# Patient Record
Sex: MALE | Race: ASIAN | URBAN - METROPOLITAN AREA
[De-identification: names, ages, dates, MRNs, and addresses within clinical notes are randomized per-mention and may not be internally consistent; named-entity substitution may affect disease eponyms.]

---

## 2017-11-20 ENCOUNTER — APPOINTMENT (OUTPATIENT)
Dept: GENERAL RADIOLOGY | Facility: CLINIC | Age: 27
End: 2017-11-20
Attending: EMERGENCY MEDICINE
Payer: COMMERCIAL

## 2017-11-20 ENCOUNTER — HOSPITAL ENCOUNTER (EMERGENCY)
Facility: CLINIC | Age: 27
Discharge: HOME | End: 2017-11-21
Attending: EMERGENCY MEDICINE | Admitting: EMERGENCY MEDICINE
Payer: COMMERCIAL

## 2017-11-20 VITALS
RESPIRATION RATE: 16 BRPM | DIASTOLIC BLOOD PRESSURE: 47 MMHG | TEMPERATURE: 97.9 F | SYSTOLIC BLOOD PRESSURE: 141 MMHG | OXYGEN SATURATION: 100 %

## 2017-11-20 DIAGNOSIS — M23.209 OLD TEAR OF MENISCUS OF KNEE, UNSPECIFIED LATERALITY, UNSPECIFIED MENISCUS, UNSPECIFIED TEAR TYPE: ICD-10-CM

## 2017-11-20 PROCEDURE — 73560 X-RAY EXAM OF KNEE 1 OR 2: CPT | Mod: RT

## 2017-11-20 PROCEDURE — 99283 EMERGENCY DEPT VISIT LOW MDM: CPT | Mod: Z6 | Performed by: EMERGENCY MEDICINE

## 2017-11-20 PROCEDURE — 99284 EMERGENCY DEPT VISIT MOD MDM: CPT | Mod: 25 | Performed by: EMERGENCY MEDICINE

## 2017-11-20 PROCEDURE — 29505 APPLICATION LONG LEG SPLINT: CPT | Mod: RT | Performed by: EMERGENCY MEDICINE

## 2017-11-20 RX ORDER — OXYCODONE HYDROCHLORIDE 5 MG/1
5 TABLET ORAL EVERY 6 HOURS PRN
Qty: 10 TABLET | Refills: 0 | Status: SHIPPED | OUTPATIENT
Start: 2017-11-20

## 2017-11-20 NOTE — ED NOTES
"Pt comes in with a \"locked up\" right knee. Pt has hx torn right acl, waiting for surgery. Was at the gym today when he suddenly could not bend his knee. Alert and oriented, vss.   "

## 2017-11-20 NOTE — ED AVS SNAPSHOT
[facility], Emergency Department    [address] [facility] [address]    Phone:  [phone]                                       Kelton Ibrahim   MRN: [id number]    Department:  [facility], Emergency Department   Date of Visit:  11/20/2017           Patient Information     Date Of Birth          1990        Your diagnoses for this visit were:     Old tear of meniscus of knee, unspecified laterality, unspecified meniscus, unspecified tear type        You were seen by Andrew Clifford MD.      Follow-up Information     Follow up with [facility], Emergency Department.    Specialty:  EMERGENCY MEDICINE    Why:  If symptoms worsen    Contact information:    [address] [facility] [address] [phone]    Additional information:    The [facility] is located on the corner of [facility] and [facility] on the [facility]. It is easily accessible from virtually any point in the [place] area, via [address] and [address].        Discharge Instructions           Knee Pain with Possible Torn Meniscus    The meniscus is a tough cartilage pad that cushions the inside of the knee joint. It helps absorb the shock from movement. It also spreads the weight of your body evenly across the knee joint. This prevents excess wear and tear to the bones of that joint.  The most common causes of meniscal tears are injuries, especially related to sports and degenerative disease that happens with aging.  A meniscus tear commonly happens during a twisting injury when the knee is bent. This causes pain, swelling, reduced movement of the knee, and trouble walking. There may be popping, clicking, joint locking or inability to completely straighten the knee. Ligaments of the knee may also be injured.  A torn meniscus is diagnosed by physical exam and X-rays. In the case of a severe injury, the knee may be too painful to examine fully. A more accurate exam can be done after the  initial swelling goes down. An MRI may be ordered to make a final diagnosis.  If your healthcare provider suspects a meniscal injury, you will treat your knee with ice and rest and preventing movement of the knee. A splint or knee brace that keeps your leg straight may be put on to protect the joint. Depending on the severity of the injury, surgery may be needed. A cartilage injury may take 4-12 weeks to heal depending on how bad it is.  Home care    Stay off the injured leg as much as possible until you can walk on it without pain. If you have a lot of pain when walking, crutches or a walker may be prescribed. (These can be rented or bought at many pharmacies and surgical or orthopedic supply stores). Follow your healthcare provider's advice about when to begin putting weight on that leg.    Keep your leg elevated to reduce pain and swelling. When sleeping, place a pillow under the injured leg. When sitting, support the injured leg so it is level with your waist. This is very important during the first 48 hours.    Apply an ice pack over the injured area for 15 to 20 minutes every 3 to 6 hours. You should do this for the first 24 to 48 hours. You can make an ice pack by filling a plastic bag that seals at the top with ice cubes and then wrapping it with a thin towel. Continue to use ice packs for relief of pain and swelling as needed. As the ice melts, be careful to avoid getting your wrap, splint, or cast wet. After 48 hours, apply heat (warm shower or warm bath) for 15 to 20 minutes several times a day, or alternate ice and heat. You can place the ice pack directly over the splint. If you have to wear a hook-and-loop knee brace, you can open it to apply the ice pack, or heat, directly to the knee. Never put ice directly on the skin. Always wrap the ice in a towel or other type of cloth.    You may use over-the-counter pain medicine to control pain, unless another pain medicine was prescribed. If you have chronic  liver or kidney disease or ever had a stomach ulcer, talk with your healthcare provider before using these medicines.    If you were given a splint, keep it dry at all times. Bathe with your splint out of the water. Protect it with a large plastic bag that is rubber-banded at the top end. If a fiberglass splint gets wet, you can dry it with a hair dryer. If you have a hook-and-loop knee brace, you can remove this to bathe, unless told otherwise.    Check with your healthcare provider before returning to sports or full work duties.  Follow-up care  Follow up with your healthcare provider, or as advised. This is usually within 1-2 weeks. Further testing may be required to check the extent of your injury.  If X-rays were taken, you will be told of any new findings that may affect your care.  Call 911  Call 911 if you have:     Shortness of breath    Chest pain  When to seek medical advice  Call your healthcare provider right away if any of these occur:    Toes or foot gets swollen, cold, blue, numb, or tingly    Pain or swelling spreads over the knee or calf    Warmth or redness appears over the knee or calf    Fever of 100.4 F (38 C) or above lasting for 24 to 48 hours  Date Last Reviewed: 11/23/2015 2000-2017 [employer]. [address] [place] [address]. All rights reserved. This information is not intended as a substitute for professional medical care. Always follow your healthcare professional's instructions.          Treating Meniscus Problems  The type of surgery you have depends on the nature of your tear. its size and location. Your surgeon may use arthroscopy, a method that involves putting a tiny camera inside your knee, so that your doctor can clearly see your joint. Arthroscopy requires only small incisions (cuts), and you can usually go home the same day as surgery. During surgery, you may have local anesthesia (your doctor numbs your knee with medicine), a regional block  (numbing your body from the waist down), or general anesthesia (your doctor gives you drugs to put you into a deep sleep so you do't feel pain.)  Pre-op checklist    Don't eat or drink 10 hours before surgery.    Arrange for someone to drive you home after surgery.    Tell your doctor if you take any medicine, supplements, or herbal remedies.        Repair  For certain tears, your surgeon will try to repair the meniscus. He or she will sew torn edges so they can heal properly. Or your surgeon will use special fasteners to repair damage. In some cases, repairs may require another incision at the back or side of your knee.       Removal  In most cases, your surgeon will remove the damaged part of your meniscus. The meniscus won't completely grow back, so your doctor will remove as little tissue as possible. Other tissue, called the articular cartilage, will take over the role as shock absorber for your knee joint.       After surgery  You'll spend some time in the recovery area and can go home when you've recovered from the anesthesia. Your knee will be bandaged, and you may have stitches, steri-strips, or staples. You may need crutches to keep weight off the knee and may have a splint for support.  Date Last Reviewed: 9/4/2015 2000-2017 [employer]. [address] [place] [address]. All rights reserved. This information is not intended as a substitute for professional medical care. Always follow your healthcare professional's instructions.          Understanding Meniscal Tears    The meniscus is a tough cushion of fibrous tissue called cartilage in the knee joint. It cushions the knee. It absorbs shock and helps spread weight across the knee joint. It also works with other parts of the knee to help keep the joint stable. Injury or aging can cause the meniscus to tear and lead to pain and problems using the knee.   What causes meniscal tears?  A sudden injury can tear the meniscus. This is  often because of planting the foot and twisting the knee. Sports such as soccer, football, and basketball are often involved. Repeated actions, such as squatting, may also lead to a tear. Breakdown of the meniscus because of aging can also lead to tears.  Symptoms of meniscal tears  These can include:    Knee pain    Knee swelling    Catching of the knee or inability to straighten the knee    Unstable feeling in the knee  Treatment for meniscal tears  A tear is unlikely to heal on its own. You often will need surgery to repair a tear. In many cases, your healthcare provider will first try treatments to help relieve symptoms. These may include:    Rest the knee. This means avoiding any activity that puts stress on the knee joint. These include kneeling, squatting, jogging, and climbing stairs. In some cases, you may need to use crutches for a time to keep body weight off of the knee joint.    Cold pack. Putting a cold pack on the knee helps reduce pain and swelling.    Knee brace. Bracing the knee helps support it.    Medicine. Prescription and over-the-counter pain medicines can help relieve swelling and pain.    Exercises. Exercises help strengthen the muscles of the leg to help support the knee joint.  If these treatments don t help relieve symptoms or the injury is severe, you may need surgery. This can repair the meniscus to relieve symptoms and restore movement.     When to call your healthcare provider  Call your healthcare provider right away if you have any of these:    Fever of 100.4 F (38 C) or higher, or as directed    Pain or swelling that gets worse, including pain in the calf    Numbness or tingling in leg or foot    You suddenly can t put any weight on your leg    Your knee  locks    Date Last Reviewed: 3/10/2016    5972-3519 [employer]. [address] [place] [address], [place] [address]. All rights reserved. This information is not intended as a substitute for professional medical care. Always  follow your healthcare professional's instructions.      Please make an appointment to follow up with Orthopedics (phone: [phone]) for an appointment on Wednesday    24 Hour Appointment Hotline       To make an appointment at any [facility] clinic, call [phone] ([phone]). If you don't have a family doctor or clinic, we will help you find one. [facility] clinics are conveniently located to serve the needs of you and your family.          ED Discharge Orders     Crutches       Use gait belt during crutch training.            Knee immobilizer Universal                    Review of your medicines      Notice     You have not been prescribed any medications.            Procedures and tests performed during your visit     XR Knee Right 1/2 Views      Orders Needing Specimen Collection     None      Pending Results     Date and Time Order Name Status Description    11/20/2017 1839 XR Knee Right 1/2 Views Preliminary             Pending Culture Results     No orders found from 11/18/2017 to 11/21/2017.            Pending Results Instructions     If you had any lab results that were not finalized at the time of your Discharge, you can call the ED Lab Result RN at [phone]. You will be contacted by this team for any positive Lab results or changes in treatment. The nurses are available 7 days a week from 10A to 6:30P.  You can leave a message 24 hours per day and they will return your call.        Thank you for choosing [facility]       Thank you for choosing [facility] for your care. Our goal is always to provide you with excellent care. Hearing back from our patients is one way we can continue to improve our services. Please take a few minutes to complete the written survey that you may receive in the mail after you visit with us. Thank you!        [employer] Information     [employer] lets you send messages to your doctor, view your test results, renew your prescriptions, schedule appointments and more. To sign  "up, go to [email or web address] . Click on \"Log in\" on the left side of the screen, which will take you to the Welcome page. Then click on \"Sign up Now\" on the right side of the page.     You will be asked to enter the access code listed below, as well as some personal information. Please follow the directions to create your username and password.     Your access code is: [email or web address]  Expires: 2018  7:53 PM     Your access code will  in 90 days. If you need help or a new code, please call your [facility] clinic or [phone].        Care EveryWhere ID     This is your Care EveryWhere ID. This could be used by other organizations to access your [facility] medical records  [id number]        Equal Access to Services     VERONICA LAWLER : Nandini Kennedy, shayy eng, gissell reyna, dell de souza. So [facility] [phone].    ATENCIÓN: Si habla español, tiene a watts disposición servicios gratuitos de asistencia lingüística. Shahram al [phone].    We comply with applicable federal civil rights laws and [place] laws. We do not discriminate on the basis of race, color, national origin, age, disability, sex, sexual orientation, or gender identity.            After Visit Summary       This is your record. Keep this with you and show to your community pharmacist(s) and doctor(s) at your next visit.                  "

## 2017-11-20 NOTE — LETTER
November 20, 2017      To Whom It May Concern:      Kelton Ibrahim was seen in our Emergency Department today, 11/20/17.  I expect his condition to improve over the next few weeks, but he must be in a knee immobilizer and use crutches, without bearing weight on his right leg until he is seen by an orthopedist.  He may return to work/school when cleared by the orthopedist.    Sincerely,        Andrew Clifford MD

## 2017-11-20 NOTE — ED AVS SNAPSHOT
[facility], [place], Emergency Department    [address] [place] [address]    Phone:  [phone]                                       Kelton Ibrahim   MRN: [id number]    Department:  [facility], Emergency Department   Date of Visit:  11/20/2017           After Visit Summary Signature Page     I have received my discharge instructions, and my questions have been answered. I have discussed any challenges I see with this plan with the nurse or doctor.    ..........................................................................................................................................  Patient/Patient Representative Signature      ..........................................................................................................................................  Patient Representative Print Name and Relationship to Patient    ..................................................               ................................................  Date                                            Time    ..........................................................................................................................................  Reviewed by Signature/Title    ...................................................              ..............................................  Date                                                            Time

## 2017-11-21 ENCOUNTER — TELEPHONE (OUTPATIENT)
Dept: ORTHOPEDICS | Facility: CLINIC | Age: 27
End: 2017-11-21

## 2017-11-21 NOTE — DISCHARGE INSTRUCTIONS
Knee Pain with Possible Torn Meniscus    The meniscus is a tough cartilage pad that cushions the inside of the knee joint. It helps absorb the shock from movement. It also spreads the weight of your body evenly across the knee joint. This prevents excess wear and tear to the bones of that joint.  The most common causes of meniscal tears are injuries, especially related to sports and degenerative disease that happens with aging.  A meniscus tear commonly happens during a twisting injury when the knee is bent. This causes pain, swelling, reduced movement of the knee, and trouble walking. There may be popping, clicking, joint locking or inability to completely straighten the knee. Ligaments of the knee may also be injured.  A torn meniscus is diagnosed by physical exam and X-rays. In the case of a severe injury, the knee may be too painful to examine fully. A more accurate exam can be done after the initial swelling goes down. An MRI may be ordered to make a final diagnosis.  If your healthcare provider suspects a meniscal injury, you will treat your knee with ice and rest and preventing movement of the knee. A splint or knee brace that keeps your leg straight may be put on to protect the joint. Depending on the severity of the injury, surgery may be needed. A cartilage injury may take 4-12 weeks to heal depending on how bad it is.  Home care    Stay off the injured leg as much as possible until you can walk on it without pain. If you have a lot of pain when walking, crutches or a walker may be prescribed. (These can be rented or bought at many pharmacies and surgical or orthopedic supply stores). Follow your healthcare provider's advice about when to begin putting weight on that leg.    Keep your leg elevated to reduce pain and swelling. When sleeping, place a pillow under the injured leg. When sitting, support the injured leg so it is level with your waist. This is very important during the first 48  hours.    Apply an ice pack over the injured area for 15 to 20 minutes every 3 to 6 hours. You should do this for the first 24 to 48 hours. You can make an ice pack by filling a plastic bag that seals at the top with ice cubes and then wrapping it with a thin towel. Continue to use ice packs for relief of pain and swelling as needed. As the ice melts, be careful to avoid getting your wrap, splint, or cast wet. After 48 hours, apply heat (warm shower or warm bath) for 15 to 20 minutes several times a day, or alternate ice and heat. You can place the ice pack directly over the splint. If you have to wear a hook-and-loop knee brace, you can open it to apply the ice pack, or heat, directly to the knee. Never put ice directly on the skin. Always wrap the ice in a towel or other type of cloth.    You may use over-the-counter pain medicine to control pain, unless another pain medicine was prescribed. If you have chronic liver or kidney disease or ever had a stomach ulcer, talk with your healthcare provider before using these medicines.    If you were given a splint, keep it dry at all times. Bathe with your splint out of the water. Protect it with a large plastic bag that is rubber-banded at the top end. If a fiberglass splint gets wet, you can dry it with a hair dryer. If you have a hook-and-loop knee brace, you can remove this to bathe, unless told otherwise.    Check with your healthcare provider before returning to sports or full work duties.  Follow-up care  Follow up with your healthcare provider, or as advised. This is usually within 1-2 weeks. Further testing may be required to check the extent of your injury.  If X-rays were taken, you will be told of any new findings that may affect your care.  Call 911  Call 911 if you have:     Shortness of breath    Chest pain  When to seek medical advice  Call your healthcare provider right away if any of these occur:    Toes or foot gets swollen, cold, blue, numb, or  tingly    Pain or swelling spreads over the knee or calf    Warmth or redness appears over the knee or calf    Fever of 100.4 F (38 C) or above lasting for 24 to 48 hours  Date Last Reviewed: 11/23/2015 2000-2017 [employer]. [address] [place] [id number]. All rights reserved. This information is not intended as a substitute for professional medical care. Always follow your healthcare professional's instructions.          Treating Meniscus Problems  The type of surgery you have depends on the nature of your tear. its size and location. Your surgeon may use arthroscopy, a method that involves putting a tiny camera inside your knee, so that your doctor can clearly see your joint. Arthroscopy requires only small incisions (cuts), and you can usually go home the same day as surgery. During surgery, you may have local anesthesia (your doctor numbs your knee with medicine), a regional block (numbing your body from the waist down), or general anesthesia (your doctor gives you drugs to put you into a deep sleep so you do't feel pain.)  Pre-op checklist    Don't eat or drink 10 hours before surgery.    Arrange for someone to drive you home after surgery.    Tell your doctor if you take any medicine, supplements, or herbal remedies.        Repair  For certain tears, your surgeon will try to repair the meniscus. He or she will sew torn edges so they can heal properly. Or your surgeon will use special fasteners to repair damage. In some cases, repairs may require another incision at the back or side of your knee.       Removal  In most cases, your surgeon will remove the damaged part of your meniscus. The meniscus won't completely grow back, so your doctor will remove as little tissue as possible. Other tissue, called the articular cartilage, will take over the role as shock absorber for your knee joint.       After surgery  You'll spend some time in the recovery area and can go home when you've  recovered from the anesthesia. Your knee will be bandaged, and you may have stitches, steri-strips, or staples. You may need crutches to keep weight off the knee and may have a splint for support.  Date Last Reviewed: 9/4/2015 2000-2017 [employer]. [address] [place] [address]. All rights reserved. This information is not intended as a substitute for professional medical care. Always follow your healthcare professional's instructions.          Understanding Meniscal Tears    The meniscus is a tough cushion of fibrous tissue called cartilage in the knee joint. It cushions the knee. It absorbs shock and helps spread weight across the knee joint. It also works with other parts of the knee to help keep the joint stable. Injury or aging can cause the meniscus to tear and lead to pain and problems using the knee.   What causes meniscal tears?  A sudden injury can tear the meniscus. This is often because of planting the foot and twisting the knee. Sports such as soccer, football, and basketball are often involved. Repeated actions, such as squatting, may also lead to a tear. Breakdown of the meniscus because of aging can also lead to tears.  Symptoms of meniscal tears  These can include:    Knee pain    Knee swelling    Catching of the knee or inability to straighten the knee    Unstable feeling in the knee  Treatment for meniscal tears  A tear is unlikely to heal on its own. You often will need surgery to repair a tear. In many cases, your healthcare provider will first try treatments to help relieve symptoms. These may include:    Rest the knee. This means avoiding any activity that puts stress on the knee joint. These include kneeling, squatting, jogging, and climbing stairs. In some cases, you may need to use crutches for a time to keep body weight off of the knee joint.    Cold pack. Putting a cold pack on the knee helps reduce pain and swelling.    Knee brace. Bracing the knee helps  support it.    Medicine. Prescription and over-the-counter pain medicines can help relieve swelling and pain.    Exercises. Exercises help strengthen the muscles of the leg to help support the knee joint.  If these treatments don t help relieve symptoms or the injury is severe, you may need surgery. This can repair the meniscus to relieve symptoms and restore movement.     When to call your healthcare provider  Call your healthcare provider right away if you have any of these:    Fever of 100.4 F (38 C) or higher, or as directed    Pain or swelling that gets worse, including pain in the calf    Numbness or tingling in leg or foot    You suddenly can t put any weight on your leg    Your knee  locks    Date Last Reviewed: 3/10/2016    2651-3452 [employer]. [address] [place] [address], [place] [address]. All rights reserved. This information is not intended as a substitute for professional medical care. Always follow your healthcare professional's instructions.      Please make an appointment to follow up with Orthopedics (phone: [phone]) for an appointment on Wednesday

## 2017-11-21 NOTE — ED PROVIDER NOTES
History     Chief Complaint   Patient presents with     Knee Pain     HPI  Kelton Ibrahim is a 27 year old male without any significant past medical history who presents to the Emergency Department today for evaluation of right knee pain. Of note, the patient reports that about 4 years ago he had a complete tear in his ACL and a partial longitudinal tear of his right knee meniscus. The patient is from [place] and is a student here in [place] and has not had surgery yet to repair his knee. While at the gym today, he states he was walking and his right knee locked up and he was unable to straighten it. He states that he did not have pain when this happened but was unable to straighten his knee. He has never experienced this before. Here in the ED, the patient has pain with extending and flexing at the knee. He denies any fall or trauma to the knee.    I have reviewed the Medications, Allergies, Past Medical and Surgical History, and Social History in the Epic system.    History reviewed. No pertinent past medical history.    History reviewed. No pertinent surgical history.    No family history on file.    Social History   Substance Use Topics     Smoking status: Not on file     Smokeless tobacco: Not on file     Alcohol use Not on file       No current facility-administered medications for this encounter.      No current outpatient prescriptions on file.      No Known Allergies     Review of Systems  All other systems negative except as noted in the HPI.  Physical Exam   BP: 141/47  Heart Rate: 82  Temp: 97.9  F (36.6  C)  Resp: 16  SpO2: 100 %      Physical Exam     Gen: NAD, sitting on stretcher, conversant and pleasant, non-toxic appearing  HEENT: NCAT, PERRL, EOMI, MMM  Neck: trachea midline, supple with FROM  Cardio: normal rate regular rhythm, no R/M/G, normally perfused and warm  Pulm: steady, non-labored respirations, normal WOB, CTAB, no w/r/r  Abd: soft nt/nd, no organomegaly, no CVA tenderness  Ext: RLE  with normal peripheral pulses, no edema, neurovascularly intact distally. He is stuck at 90 deg flexion, with marked pain with any flexion/extension. No effusion.   Skin: no rashes or signs of trauma  Neuro: no focal deficits, 5/5 strength in all ext   ED Course   6:33 PM  The patient was seen and examined by Dr. Clifford in Room [facility].    ED Course     Procedures       Xr Knee Right 1/2 Views    Result Date: 11/20/2017  Exam: XR KNEE RT 1 /2 VW, 11/20/2017 6:51 PM Indication: pain, inability to flex/extend; hx/o ACL and meniscal tears. Suspect meniscus is catching; need to r/o bony pathology.; Comparison: None Findings: AP and lateral views of the right knee, nonweightbearing. Normal bony alignment. No visualized fractures. No definite soft tissue abnormality. No suspicious bony lesion.     Impression: No acute osseous abnormality. I have personally reviewed the examination and initial interpretation and I agree with the findings. VINAY SHELTON MD         Labs Ordered and Resulted from Time of ED Arrival Up to the Time of Departure from the ED - No data to display         Assessments & Plan (with Medical Decision Making)   27-year-old otherwise healthy male presents to the emergency department with acute onset right knee pain. Of note, he did have an ACL/longitudinal meniscal tear a few years ago for which he was treated non-operatively at the time with plan for delayed reconstructive surgery which he would like to have performed in [place] where it would financially better option for him however because of his schooling, he was unable to schedule it over the past few years and so he was planning on having it done this summer. Today as he was walking his knee caught and got stuck in 90  of flexion.  On exam, he is neurovascularly intact without any concern for acute bony vascular or neurologic issues however he did have some acute knee pain with any flexion or extension of the knee. I was able to try to  manipulate the knee slightly, but unfortunately wasn t able to appreciate any popping clicking or resolution of the symptoms. I suspect that this is the meniscal flap that is flipped into the knee joint obstructing any further knee flexion or extension. X-ray was unremarkable without any acute bony pathology. I spoke with the orthopedic resident on-call who was able to speak with a sports medicine attending on-call and they recommended treating his pain with NSAIDs, putting him in a knee immobilizer at 90 ,  giving him crutches and having him follow up on Wednesday with their service for outpatient management. The patient was in agreement with plan and so he was discharged to home at this time with knee immobilizers and crutches and given information for follow-up return to Emergency Department precautions.    This part of the document was transcribed by Caroline Thompson, Medical Scribe.     I have reviewed the nursing notes.    I have reviewed the findings, diagnosis, plan and need for follow up with the patient.    New Prescriptions    OXYCODONE IR (ROXICODONE) 5 MG TABLET    Take 1 tablet (5 mg) by mouth every 6 hours as needed for pain       Final diagnoses:   Old tear of meniscus of knee, unspecified laterality, unspecified meniscus, unspecified tear type   I, Lonny Abdi, am serving as a trained medical scribe to document services personally performed by Andrew Clifford MD, based on the provider's statements to me.   I, Andrew Clifford MD, was physically present and have reviewed and verified the accuracy of this note documented by Lonny Abdi.      11/20/2017   [facility], [place], EMERGENCY DEPARTMENT     Andrew Clifford MD  11/21/17 0054

## 2017-11-21 NOTE — TELEPHONE ENCOUNTER
----- Message from Johanny Apodaca sent at 11/21/2017  8:11 AM CST -----  Dr Stinson   Please do not send these to the ortho scheduling pool, we are not the ones that handle these. Please in the future send them to the [facility] pool, I have attached them to this email also.    Thanks    ----- Message -----     From: Diane Stinson MD     Sent: 11/21/2017   8:08 AM       To: Ortho Surgery Scheduling Pool-[facility]    Patient to be seen with Dr. Lr Wednesday this week. Patient should be instructed to obtain his outside records and films if possible    11/21/17  Patient 's voicemail not set up so not able to schedule appointment.  Hopefully patient will call back for appt.